# Patient Record
(demographics unavailable — no encounter records)

---

## 2024-10-09 NOTE — ASSESSMENT
[FreeTextEntry1] : 76-year-old woman with ESRD due to diabetic nephropathy s/p LURD transplant on 7/19/21. Baseline creatinine 0.8-1.2mg/dL . No proteinuria. Pending today  Immunosuppression S/p Thymoglobulin induction (for low level Class I DSA, B7 =3249) On Envarsus 6mg po daily.. Trough goal 5-7 CellCept 250mg po bid - dose lowered for leukopenia and recurrent UTI Prednisone 5mg po daily  CAD - dx 2/2024. S/p stent x1 on 2/27/24. On Plavix, metoprolol and Crestor.  No anginal symptoms. Followed by cardiology.  History of Recurrent UTI Has had ESBL Klebsiella, E.coli , Enterococcus UTI last in 3/2023. Self-treated with cipro for UTI symptoms 9/2023 without any culture testing. No further episodes of UTI recently. CT abdomen and pelvis 8/2022 unremarkable. Cystoscopy Oct 2022 unremarkable.  Diabetes type II on insulin. Followed by endocrine. Acceptable control. A1c 6.9 % in April 2024  - On Lantus  22 units qhs, Humalog 10 units qac. Trulicity changed to Mounjaro. Weight down 10lbs.  - Diabetic neuropathy on Amitriptyline  Hypertension - Acceptable control.  Continue Nifedipine 60/30 mg po bid. Metoprolol xl 50mg po qhs  Hypomagnesemia - continue Mg 400mg po bid.  Osteoporosis - on Prolia q 6 months. Continue calcium vitamin D Vitamin D deficiency - ergocalciferol 50,000 units q week  F/u with PCP and cardiology F/u with primary nephrologist Dr. Crenshaw. Return here in 6 months.

## 2024-10-09 NOTE — PHYSICAL EXAM
[General Appearance - Alert] : alert [General Appearance - In No Acute Distress] : in no acute distress [General Appearance - Well-Appearing] : healthy appearing [Sclera] : the sclera and conjunctiva were normal [PERRL With Normal Accommodation] : pupils were equal in size, round, and reactive to light [Jugular Venous Distention Increased] : there was no jugular-venous distention [Respiration, Rhythm And Depth] : normal respiratory rhythm and effort [Exaggerated Use Of Accessory Muscles For Inspiration] : no accessory muscle use [Auscultation Breath Sounds / Voice Sounds] : lungs were clear to auscultation bilaterally [Heart Rate And Rhythm] : heart rate was normal and rhythm regular [Heart Sounds] : normal S1 and S2 [Heart Sounds Gallop] : no gallops [Murmurs] : no murmurs [Heart Sounds Pericardial Friction Rub] : no pericardial rub [Edema] : there was no peripheral edema [Bowel Sounds] : normal bowel sounds [Abdomen Soft] : soft [Abdomen Tenderness] : non-tender [Abnormal Walk] : normal gait [Musculoskeletal - Swelling] : no joint swelling seen [] : no rash [No Focal Deficits] : no focal deficits [Oriented To Time, Place, And Person] : oriented to person, place, and time [FreeTextEntry1] : Obese abdomen. RLQ graft no tenderness. No bruit.

## 2024-10-09 NOTE — HISTORY OF PRESENT ILLNESS
[FreeTextEntry1] : 76-year-old woman with ESRD due to diabetic nephropathy on PD since 8/2020. S/p LURD transplant on 7/19/21. Baseline creatinine 1.2mg/dL.   PMH: Diabetes type II x 32 years, on insulin complicated by nephropathy and retinopathy. Dyslipidemia, Restless leg syndrome. PSH: Ventral hernia repair > 40 years ago Received 1st dose of COVID-19 vaccine in March 2021, had COVID-19 infection in April 2021 s/p monoclonal antibody infusion. Received 2nd dose of the vaccine in July 2021 prior to transplant.   Underwent living unrelated donor kidney transplant from altruistic donor on 7/19/21. PD catheter removed at the same time.  Donor was 49 yr old man, 1 artery, 1 vein, 1 ureter. No stent used. HLA mismatch 1,1,2.  Had low level Class I DSA B7 -3249. Thymo induction used. Baseline creatinine 1mg/dL.   Post transplant course complicated by  NANO with peak creatinine of 2mg/dL after starting Ozempic in March 2022- kidney biopsy unremarkable. Creatinine improved to 1.3.  COVID-19 infection April 2022, mild symptoms s/p MAB on 4/5/22 Recurrent Klebsiella and Ecoli UTI requiring antibiotics, and hospitalization. Last episode 3/2023 Enterococcus rx with Levaquin CT abd and pelvis 8/29/22 no pelvic pathology. Cystoscopy 10/2022 unremarkable.  She had cardiac stent x1 placed in 2/2024 after presenting with chest pain and shortness of breath and stress test turned out +ve.   Followed by her primary nephrologist Dr. Crenshaw, seen there in July. No changes made.  Last seen here 6 months ago. Had mild COVID infection few weeks ago. Fully recovered.  Feels well. Has started Mounjaro (instead of Trulicity), lost 10lbs. Blood glucose well controlled.  Voiding urine without difficulty. No dysuria or hematuria. No recent UTIs. Takes Bactrim at the onset of dysuria and symptoms are controlled.  No abdominal pain, nausea, vomiting or diarrhea. Appetite good.  No fever, chills. Energy fair.  No chest pain or shortness of breath, no leg swelling.  -140 systolic on Nifedipine 60mg po am, 30mg po qhs, Metoprolol 50mg po qhs  Physically more active, does yoga 2-3 times a week.

## 2025-04-09 NOTE — HISTORY OF PRESENT ILLNESS
[FreeTextEntry1] : 77-year-old woman with ESRD due to diabetic nephropathy on PD since 8/2020. S/p LURD transplant on 7/19/21. Baseline creatinine 1.2mg/dL.   PMH: Diabetes type II x 32 years, on insulin complicated by nephropathy and retinopathy. Dyslipidemia, Restless leg syndrome. PSH: Ventral hernia repair > 40 years ago Received 1st dose of COVID-19 vaccine in March 2021, had COVID-19 infection in April 2021 s/p monoclonal antibody infusion. Received 2nd dose of the vaccine in July 2021 prior to transplant.   Underwent living unrelated donor kidney transplant from altruistic donor on 7/19/21. PD catheter removed at the same time.  Donor was 49 yr old man, 1 artery, 1 vein, 1 ureter. No stent used. HLA mismatch 1,1,2.  Had low level Class I DSA B7 -3249. Thymo induction used. Baseline creatinine 1mg/dL.   Post transplant course complicated by  NANO with peak creatinine of 2mg/dL after starting Ozempic in March 2022- kidney biopsy unremarkable. Creatinine improved to 1.3.  COVID-19 infection April 2022, mild symptoms s/p MAB on 4/5/22 Recurrent Klebsiella and Ecoli UTI requiring antibiotics, and hospitalization. Last episode 3/2023 Enterococcus rx with Levaquin CT abd and pelvis 8/29/22 no pelvic pathology. Cystoscopy 10/2022 unremarkable.  She had cardiac stent x1 placed in 2/2024 after presenting with chest pain and shortness of breath and stress test turned out +ve.   Last seen here 6 months ago. Did not see her primary nephrologist Dr. Crenshaw recently.   Hospitalized 3/22/2025-4/2/2025 at [facility] for fever, dx with A.fib s/p cardioversion, now on Eliquis, and Diltiazem.  Plavix and Nifedipine discontinued.  She has shortness of breath on exertion, has follow up with cardiology scheduled next week.  Voiding urine without difficulty. No dysuria or hematuria. No recent UTIs.  No abdominal pain, nausea, vomiting or diarrhea. Appetite good.  Constipation rx with senna and Linzess.  No fever, chills. Energy fair.  No chest pain or shortness of breath, no leg swelling.  BP elevated 150-160 at home.  Blood glucose 150-200, has CGM. On insulin, stopped Mounjaro due to poor appetite and dehydration  Weight 164lbs down 6lbs since last visit.

## 2025-04-09 NOTE — ASSESSMENT
[FreeTextEntry1] : 77-year-old woman with ESRD due to diabetic nephropathy s/p LURD transplant on 7/19/21. Baseline creatinine 0.8-1.2mg/dL . No proteinuria. Cr stable 0.88mg/dL today.  Immunosuppression S/p Thymoglobulin induction (for low level Class I DSA, B7 =3249) On Envarsus 6mg po daily. Trough goal 5-7. Level 6.5 today at goal.  CellCept 250mg po bid - dose lowered for leukopenia and recurrent UTI Prednisone 5mg po daily  CAD - dx 2/2024. S/p stent x1 on 2/27/24.  Plavix discontinued 3/2025, continue metoprolol & Crestor. Not on Aspirin.  New onset A.fib 3/2025 - s/p cardioversion, Started on Eliquis. On metoprolol and Cardizem   History of Recurrent UTI Has had ESBL Klebsiella, E.coli , Enterococcus UTI last in 3/2023. Self-treated with cipro for UTI symptoms 9/2023 without any culture testing. No further episodes of UTI recently. CT abdomen and pelvis 8/2022 unremarkable. Cystoscopy Oct 2022 unremarkable.  Diabetes type II on insulin. Followed by endocrine. Acceptable control. A1c 6.9 % in April 2024  - On Lantus  22 units qhs, Humalog 10 units qac. Mounjaro paused due to recent illness - plans to resume  - Diabetic neuropathy on Amitriptyline  Hypertension - BP sub optimal.  Metoprolol xl 100mg bid, Cardizem 240mg daily, add Losartan 50mg po qhs   Hypomagnesemia - continue Mg 400mg po bid.  Osteoporosis - on Prolia q 6 months. Continue calcium vitamin D Vitamin D deficiency - ergocalciferol 50,000 units q week  F/u with PCP and cardiology F/u with primary nephrologist Dr. Crenshaw. Return here in 6 months.

## 2025-04-09 NOTE — HISTORY OF PRESENT ILLNESS
[FreeTextEntry1] : 77-year-old woman with ESRD due to diabetic nephropathy on PD since 8/2020. S/p LURD transplant on 7/19/21. Baseline creatinine 1.2mg/dL.   PMH: Diabetes type II x 32 years, on insulin complicated by nephropathy and retinopathy. Dyslipidemia, Restless leg syndrome. PSH: Ventral hernia repair > 40 years ago Received 1st dose of COVID-19 vaccine in March 2021, had COVID-19 infection in April 2021 s/p monoclonal antibody infusion. Received 2nd dose of the vaccine in July 2021 prior to transplant.   Underwent living unrelated donor kidney transplant from altruistic donor on 7/19/21. PD catheter removed at the same time.  Donor was 49 yr old man, 1 artery, 1 vein, 1 ureter. No stent used. HLA mismatch 1,1,2.  Had low level Class I DSA B7 -3249. Thymo induction used. Baseline creatinine 1mg/dL.   Post transplant course complicated by  NANO with peak creatinine of 2mg/dL after starting Ozempic in March 2022- kidney biopsy unremarkable. Creatinine improved to 1.3.  COVID-19 infection April 2022, mild symptoms s/p MAB on 4/5/22 Recurrent Klebsiella and Ecoli UTI requiring antibiotics, and hospitalization. Last episode 3/2023 Enterococcus rx with Levaquin CT abd and pelvis 8/29/22 no pelvic pathology. Cystoscopy 10/2022 unremarkable.  She had cardiac stent x1 placed in 2/2024 after presenting with chest pain and shortness of breath and stress test turned out +ve.   Last seen here 6 months ago. Did not see her primary nephrologist Dr. Crenshaw recently.   Hospitalized 3/22/2025-4/2/2025 at [facility] for fever, dx with A.fib s/p cardioversion, now on Eliquis, and Diltiazem.  Plavix and Nifedipine discontinued.  She has shortness of breath on exertion, has follow up with cardiology scheduled next week.  Voiding urine without difficulty. No dysuria or hematuria. No recent UTIs.  No abdominal pain, nausea, vomiting or diarrhea. Appetite good.  Constipation rx with senna and Linzess.  No fever, chills. Energy fair.  No chest pain or shortness of breath, no leg swelling.  BP elevated 150-160 at home.  Blood glucose 150-200, has CGM. On insulin, stopped Mounjaro due to poor appetite and dehydration  Weight 164lbs down 6lbs since last visit.

## 2025-04-10 NOTE — HISTORY OF PRESENT ILLNESS
[FreeTextEntry1] : She sought care cardiac evaluation and management and is transferring her care from her previous cardiologist. She is a 77-year-old with a history of renal transplant, hypertension, diabetes mellitus and coronary artery disease with recent episode of atrial fibrillation. She is accompanied by daughter who assists with some translation and review of her notes from [facility] [employer] [facility]isa or done.  Most recently, She has noted shortness of breath and rapid heartbeats in March 2025 at which point she went to the hospital and was noted to be in atrial fibrillation with rapid ventricular response.  She underwent EDWARDO cardioversion and is now in sinus rhythm.  Echocardiography done at the time showed normal left ventricular systolic function with moderate left atrial enlargement and no other significant valvular abnormalities. Prior to that, in February 2024 she underwent coronary angiography after exertional shortness of breath and left chest pain.  The results were severe two-vessel coronary disease with a distal RCA severe stenosis that was stented and a calcified  of D1.  There was moderate atherosclerotic plaque throughout the remaining vessels and there were collaterals to the diagonal branch region. She has been taking her medications without difficulty since her discharge from the hospital though she was discharged without any antiplatelet therapy and on Eliquis alone. She has no known history of myocardial infarction, or smoking. No definite heart failure diagnosed, but her proBNP was over 7000 when she was admitted to the hospital with New atrial fibrillation. She currently denies any orthopnea PND or leg edema but does get short of breath after walking 1 block and has to rest for a few minutes. She underwent renal transplant in 2021 and has been on stable medical therapy since. Longstanding diabetes and hypertension.

## 2025-04-10 NOTE — DISCUSSION/SUMMARY
[FreeTextEntry1] : She is a 77-year-old with a history of diabetes mellitus, renal transplant, coronary artery disease and recent episode of atrial fibrillation.  Her syndrome is consistent with chronic diastolic heart failure. ECG shows sinus bradycardia with first-degree AV block and poor R wave progression. Diastolic heart failure/chronic: I reviewed the pathophysiology of diastolic heart failure with her and the need for more aggressive fluid management and blood pressure management (her home blood pressures seem elevated).  Towards that end I have exchanged her losartan for Entresto 24/26 twice daily. Labs in 2 weeks to monitor her potassium and creatinine levels.  Blood pressure check in 3 to 4 weeks with me. CAD: No definite anginal symptoms.  Recent cardiac cath in December 2024 showed unchanged atherosclerosis compared with the cath done 10 months earlier. Continue high-dose statin for goal LDL of less than 70.  She will benefit from the addition of an antiplatelet agent such as baby aspirin. Paroxysmal atrial fibrillation: We reviewed the pathophysiology of this and given her moderate left atrial lodgment it seems like that this will occur again.  She is to continue on oral anticoagulation for stroke risk reduction.  [EKG obtained to assist in diagnosis and management of assessed problem(s)] : EKG obtained to assist in diagnosis and management of assessed problem(s)

## 2025-05-08 NOTE — HISTORY OF PRESENT ILLNESS
[FreeTextEntry1] : She is returning for follow-up of her blood pressure today she is accompanied by her daughter serves as . She has been taking verapamil based on her morning blood pressure readings and has hold it for low blood pressure readings.  Occasional lightheadedness. She is also taking metoprolol twice daily. Her blood pressure range is from the systolic of 120s to the 170s. No leg edema is noted.  Prior: She sought care cardiac evaluation and management and is transferring her care from her previous cardiologist. She is a 77-year-old with a history of renal transplant, hypertension, diabetes mellitus and coronary artery disease with recent episode of atrial fibrillation. She is accompanied by daughter who assists with some translation and review of her notes from [facility] [employer] [facility]isa or done.  Most recently, She has noted shortness of breath and rapid heartbeats in March 2025 at which point she went to the hospital and was noted to be in atrial fibrillation with rapid ventricular response.  She underwent EDWARDO cardioversion and is now in sinus rhythm.  Echocardiography done at the time showed normal left ventricular systolic function with moderate left atrial enlargement and no other significant valvular abnormalities. Prior to that, in February 2024 she underwent coronary angiography after exertional shortness of breath and left chest pain.  The results were severe two-vessel coronary disease with a distal RCA severe stenosis that was stented and a calcified  of D1.  There was moderate atherosclerotic plaque throughout the remaining vessels and there were collaterals to the diagonal branch region. She has been taking her medications without difficulty since her discharge from the hospital though she was discharged without any antiplatelet therapy and on Eliquis alone. She has no known history of myocardial infarction, or smoking. No definite heart failure diagnosed, but her proBNP was over 7000 when she was admitted to the hospital with New atrial fibrillation. She currently denies any orthopnea PND or leg edema but does get short of breath after walking 1 block and has to rest for a few minutes. She underwent renal transplant in 2021 and has been on stable medical therapy since. Longstanding diabetes and hypertension.

## 2025-05-08 NOTE — DISCUSSION/SUMMARY
[FreeTextEntry1] : She is a 77-year-old with a history of diabetes mellitus, renal transplant, coronary artery disease and recent episode of atrial fibrillation. Chronic diastolic heart failure is well-controlled and a proBNP is normal. ECG shows sinus bradycardia with first-degree AV block and poor R wave progression. Unchanged from prior. Diastolic heart failure/chronic: She is tolerating Entresto and I have increased the dose and discontinued her Diltiazem as she has been taking this intermittently anyway.  I instructed her to obtain a tacro level once off diltiazem given its interactions.  Labs in 2 weeks to monitor her potassium and creatinine levels.  Blood pressure check in 3 to 4 weeks with Sandi. CAD: Her chest pain is not anginal.  Likely GERD. No definite anginal symptoms.  Recent cardiac cath in December 2024 showed unchanged atherosclerosis compared with the cath done 10 months earlier. Continue high-dose statin for goal LDL of less than 70.  She will benefit from the addition of an antiplatelet agent such as baby aspirin. Paroxysmal atrial fibrillation:Continue oral anticoagulation for stroke risk reduction. [EKG obtained to assist in diagnosis and management of assessed problem(s)] : EKG obtained to assist in diagnosis and management of assessed problem(s)

## 2025-05-29 NOTE — DISCUSSION/SUMMARY
[EKG obtained to assist in diagnosis and management of assessed problem(s)] : EKG obtained to assist in diagnosis and management of assessed problem(s) [FreeTextEntry1] : She is a 77-year-old with a history of diabetes mellitus, renal transplant, coronary artery disease and  episode of atrial fibrillation. Chronic diastolic heart failure is well-controlled and a proBNP is normal. ECG shows sinus bradycardia with first-degree AV block and poor R wave progression. Unchanged from prior. Diastolic heart failure/chronic: She is tolerating Entresto 49-51 mg BID.    CAD:  Recent cardiac cath in December 2024 showed unchanged atherosclerosis compared with the cath done 10 months earlier. Continue high-dose statin for goal LDL of less than 70.  She will benefit from the Continue use of aspirin. Adding low dose amlodipine 2.5 mg daily for antianginal effect.  Paroxysmal atrial fibrillation: Continue oral anticoagulation for stroke risk reduction. Follow-up in 1 month to see if her symptoms have improved.Plan reviewed with her daughter who serves as . Encouraged exercise.  She will follow up in the office in 6 weeks.

## 2025-05-29 NOTE — HISTORY OF PRESENT ILLNESS
[FreeTextEntry1] : Rosa returns today for close interval follow up, ECG and blood pressure check. Today she is accompanied by her granddaughter who assists with [place] translation at the patient's request.  She reports a return of her exertional chest discomfort/shortness of breath (?) about 15 days ago.  It happens after about one block. The discomfort feels like a heaviness on the chest.  She also reports 3-4 pound weight gain. No edema, pro . Recently stopped Mounjouro per endocrinology.  BP per manual check 122/62 mm Hg. Per home machine 127/64 mm Hg.    Prior: She is returning for follow-up of her blood pressure today she is accompanied by her daughter serves as . She has been taking verapamil based on her morning blood pressure readings and has hold it for low blood pressure readings.  Occasional lightheadedness. She is also taking metoprolol twice daily. Her blood pressure range is from the systolic of 120s to the 170s. No leg edema is noted.  Prior: She sought care cardiac evaluation and management and is transferring her care from her previous cardiologist. She is a 77-year-old with a history of renal transplant, hypertension, diabetes mellitus and coronary artery disease with recent episode of atrial fibrillation. She is accompanied by daughter who assists with some translation and review of her notes from [facility] [employer] [facility] or done.  Most recently, She has noted shortness of breath and rapid heartbeats in March 2025 at which point she went to the hospital and was noted to be in atrial fibrillation with rapid ventricular response.  She underwent EDWARDO cardioversion and is now in sinus rhythm.  Echocardiography done at the time showed normal left ventricular systolic function with moderate left atrial enlargement and no other significant valvular abnormalities. Prior to that, in February 2024 she underwent coronary angiography after exertional shortness of breath and left chest pain.  The results were severe two-vessel coronary disease with a distal RCA severe stenosis that was stented and a calcified  of D1.  There was moderate atherosclerotic plaque throughout the remaining vessels and there were collaterals to the diagonal branch region. She has been taking her medications without difficulty since her discharge from the hospital though she was discharged without any antiplatelet therapy and on Eliquis alone. She has no known history of myocardial infarction, or smoking. No definite heart failure diagnosed, but her proBNP was over 7000 when she was admitted to the hospital with New atrial fibrillation. She currently denies any orthopnea PND or leg edema but does get short of breath after walking 1 block and has to rest for a few minutes. She underwent renal transplant in 2021 and has been on stable medical therapy since. Longstanding diabetes and hypertension.

## 2025-07-14 NOTE — DISCUSSION/SUMMARY
[FreeTextEntry1] : She is a 77-year-old with a history of diabetes mellitus, renal transplant, coronary artery disease and  episode of atrial fibrillation. Chronic diastolic heart failure is well-controlled and a proBNP is normal. ECG shows sinus rhythm with first-degree AV block and poor R wave progression. Unchanged from prior. Diastolic heart failure/chronic: As she is feeling unwell and dizzy with lower blood pressures, will reduce Entresto to 24-26 mg BID. TTE at [facility] in 4/2025 showed LVEF 60% (reviewed on patient portal).     CAD:  Recent cardiac cath in December 2024 showed unchanged atherosclerosis compared with the cath done 10 months earlier. Continue high-dose statin for goal LDL of less than 70.  She will benefit from the Continue use of aspirin.   Paroxysmal atrial fibrillation: Continue oral anticoagulation for stroke risk reduction. Continue metoprolol.   Encouraged exercise.  Follow-up in 2 weeks to see if her symptoms have improved. Plan reviewed with her daughter who serves as . [EKG obtained to assist in diagnosis and management of assessed problem(s)] : EKG obtained to assist in diagnosis and management of assessed problem(s)

## 2025-07-14 NOTE — HISTORY OF PRESENT ILLNESS
[FreeTextEntry1] : Rosa returns today for follow up. She is accompanied by her daughter today.  One week after starting amlodipine, she began to feel a flushing in her face as well as an itchiness. She stopped the medication and after 2-3 days of Benadryl, she felt back to normal.  Today she reports feeling well and has no specific complaints. She has had no further chest discomfort or shortness of breath.  Her home blood pressures are as follows: 123/68, 146/68, 92/50, 129/75, 131/69, 144/73, 119/70 mm Hg.  When her pressure is lower, she feels dizzy and fears a fall.   Prior: Rosa returns today for close interval follow up, ECG and blood pressure check. Today she is accompanied by her granddaughter who assists with Nigerien translation at the patient's request.  She reports a return of her exertional chest discomfort/shortness of breath (?) about 15 days ago.  It happens after about one block. The discomfort feels like a heaviness on the chest.  She also reports 3-4 pound weight gain. No edema, pro . Recently stopped Mounjouro per endocrinology.  BP per manual check 122/62 mm Hg. Per home machine 127/64 mm Hg.    Prior: She is returning for follow-up of her blood pressure today she is accompanied by her daughter serves as . She has been taking verapamil based on her morning blood pressure readings and has hold it for low blood pressure readings.  Occasional lightheadedness. She is also taking metoprolol twice daily. Her blood pressure range is from the systolic of 120s to the 170s. No leg edema is noted.  Prior: She sought care cardiac evaluation and management and is transferring her care from her previous cardiologist. She is a 77-year-old with a history of renal transplant, hypertension, diabetes mellitus and coronary artery disease with recent episode of atrial fibrillation. She is accompanied by daughter who assists with some translation and review of her notes from [facility] or done.  Most recently, She has noted shortness of breath and rapid heartbeats in March 2025 at which point she went to the hospital and was noted to be in atrial fibrillation with rapid ventricular response.  She underwent EDWARDO cardioversion and is now in sinus rhythm.  Echocardiography done at the time showed normal left ventricular systolic function with moderate left atrial enlargement and no other significant valvular abnormalities. Prior to that, in February 2024 she underwent coronary angiography after exertional shortness of breath and left chest pain.  The results were severe two-vessel coronary disease with a distal RCA severe stenosis that was stented and a calcified  of D1.  There was moderate atherosclerotic plaque throughout the remaining vessels and there were collaterals to the diagonal branch region. She has been taking her medications without difficulty since her discharge from the hospital though she was discharged without any antiplatelet therapy and on Eliquis alone. She has no known history of myocardial infarction, or smoking. No definite heart failure diagnosed, but her proBNP was over 7000 when she was admitted to the hospital with New atrial fibrillation. She currently denies any orthopnea PND or leg edema but does get short of breath after walking 1 block and has to rest for a few minutes. She underwent renal transplant in 2021 and has been on stable medical therapy since. Longstanding diabetes and hypertension.